# Patient Record
Sex: MALE | Race: OTHER | Employment: FULL TIME | URBAN - METROPOLITAN AREA
[De-identification: names, ages, dates, MRNs, and addresses within clinical notes are randomized per-mention and may not be internally consistent; named-entity substitution may affect disease eponyms.]

---

## 2019-12-24 ENCOUNTER — ANESTHESIA EVENT (OUTPATIENT)
Dept: SURGERY | Facility: HOSPITAL | Age: 59
DRG: 330 | End: 2019-12-24
Payer: COMMERCIAL

## 2019-12-24 ENCOUNTER — ANESTHESIA (OUTPATIENT)
Dept: SURGERY | Facility: HOSPITAL | Age: 59
DRG: 330 | End: 2019-12-24
Payer: COMMERCIAL

## 2019-12-24 ENCOUNTER — HOSPITAL ENCOUNTER (EMERGENCY)
Facility: HOSPITAL | Age: 59
Discharge: ANOTHER HOSPITAL | End: 2019-12-24
Attending: EMERGENCY MEDICINE
Payer: COMMERCIAL

## 2019-12-24 ENCOUNTER — HOSPITAL ENCOUNTER (INPATIENT)
Facility: HOSPITAL | Age: 59
LOS: 2 days | Discharge: HOME | DRG: 330 | End: 2019-12-26
Attending: COLON & RECTAL SURGERY | Admitting: COLON & RECTAL SURGERY
Payer: COMMERCIAL

## 2019-12-24 VITALS
HEIGHT: 68 IN | SYSTOLIC BLOOD PRESSURE: 121 MMHG | HEART RATE: 64 BPM | DIASTOLIC BLOOD PRESSURE: 70 MMHG | RESPIRATION RATE: 18 BRPM | BODY MASS INDEX: 19.1 KG/M2 | TEMPERATURE: 99 F | OXYGEN SATURATION: 99 % | WEIGHT: 126 LBS

## 2019-12-24 DIAGNOSIS — K61.1 PERIRECTAL ABSCESS: Primary | ICD-10-CM

## 2019-12-24 DIAGNOSIS — R19.7 DIARRHEA, UNSPECIFIED TYPE: ICD-10-CM

## 2019-12-24 DIAGNOSIS — R10.9 ABDOMINAL PAIN: ICD-10-CM

## 2019-12-24 DIAGNOSIS — I95.9 HYPOTENSION, UNSPECIFIED HYPOTENSION TYPE: ICD-10-CM

## 2019-12-24 DIAGNOSIS — R00.0 TACHYCARDIA: ICD-10-CM

## 2019-12-24 LAB
ALBUMIN SERPL BCP-MCNC: 2.5 G/DL (ref 3.5–5.2)
ALP SERPL-CCNC: 50 U/L (ref 55–135)
ALT SERPL W/O P-5'-P-CCNC: 35 U/L (ref 10–44)
ANION GAP SERPL CALC-SCNC: 13 MMOL/L (ref 8–16)
APTT BLDCRRT: 42.9 SEC (ref 21–32)
AST SERPL-CCNC: 34 U/L (ref 10–40)
BASOPHILS # BLD AUTO: 0.02 K/UL (ref 0–0.2)
BASOPHILS NFR BLD: 0.2 % (ref 0–1.9)
BILIRUB SERPL-MCNC: 1.1 MG/DL (ref 0.1–1)
BILIRUB UR QL STRIP: NEGATIVE
BUN SERPL-MCNC: 13 MG/DL (ref 6–20)
CALCIUM SERPL-MCNC: 8.3 MG/DL (ref 8.7–10.5)
CHLORIDE SERPL-SCNC: 90 MMOL/L (ref 95–110)
CLARITY UR: CLEAR
CO2 SERPL-SCNC: 25 MMOL/L (ref 23–29)
COLOR UR: YELLOW
CREAT SERPL-MCNC: 0.9 MG/DL (ref 0.5–1.4)
DIFFERENTIAL METHOD: ABNORMAL
EOSINOPHIL # BLD AUTO: 0 K/UL (ref 0–0.5)
EOSINOPHIL NFR BLD: 0 % (ref 0–8)
ERYTHROCYTE [DISTWIDTH] IN BLOOD BY AUTOMATED COUNT: 12 % (ref 11.5–14.5)
EST. GFR  (AFRICAN AMERICAN): >60 ML/MIN/1.73 M^2
EST. GFR  (NON AFRICAN AMERICAN): >60 ML/MIN/1.73 M^2
GLUCOSE SERPL-MCNC: 118 MG/DL (ref 70–110)
GLUCOSE UR QL STRIP: NEGATIVE
HCT VFR BLD AUTO: 37 % (ref 40–54)
HGB BLD-MCNC: 12.9 G/DL (ref 14–18)
HGB UR QL STRIP: NEGATIVE
IMM GRANULOCYTES # BLD AUTO: 0.04 K/UL (ref 0–0.04)
IMM GRANULOCYTES NFR BLD AUTO: 0.4 % (ref 0–0.5)
INR PPP: 1.9 (ref 0.8–1.2)
KETONES UR QL STRIP: ABNORMAL
LACTATE SERPL-SCNC: 1.8 MMOL/L (ref 0.5–2.2)
LEUKOCYTE ESTERASE UR QL STRIP: NEGATIVE
LIPASE SERPL-CCNC: 28 U/L (ref 4–60)
LYMPHOCYTES # BLD AUTO: 0.9 K/UL (ref 1–4.8)
LYMPHOCYTES NFR BLD: 9.7 % (ref 18–48)
MCH RBC QN AUTO: 32.3 PG (ref 27–31)
MCHC RBC AUTO-ENTMCNC: 34.9 G/DL (ref 32–36)
MCV RBC AUTO: 93 FL (ref 82–98)
MONOCYTES # BLD AUTO: 1 K/UL (ref 0.3–1)
MONOCYTES NFR BLD: 10.6 % (ref 4–15)
NEUTROPHILS # BLD AUTO: 7.1 K/UL (ref 1.8–7.7)
NEUTROPHILS NFR BLD: 79.1 % (ref 38–73)
NITRITE UR QL STRIP: NEGATIVE
NRBC BLD-RTO: 0 /100 WBC
PH UR STRIP: 7 [PH] (ref 5–8)
PLATELET # BLD AUTO: 373 K/UL (ref 150–350)
PMV BLD AUTO: 8.7 FL (ref 9.2–12.9)
POTASSIUM SERPL-SCNC: 3.1 MMOL/L (ref 3.5–5.1)
PROCALCITONIN SERPL IA-MCNC: 0.3 NG/ML
PROT SERPL-MCNC: 7.4 G/DL (ref 6–8.4)
PROT UR QL STRIP: NEGATIVE
PROTHROMBIN TIME: 20.4 SEC (ref 9–12.5)
RBC # BLD AUTO: 4 M/UL (ref 4.6–6.2)
SODIUM SERPL-SCNC: 128 MMOL/L (ref 136–145)
SP GR UR STRIP: <=1.005 (ref 1–1.03)
TROPONIN I SERPL DL<=0.01 NG/ML-MCNC: 0.01 NG/ML (ref 0.02–0.5)
URN SPEC COLLECT METH UR: ABNORMAL
UROBILINOGEN UR STRIP-ACNC: 4 EU/DL
WBC # BLD AUTO: 9 K/UL (ref 3.9–12.7)

## 2019-12-24 PROCEDURE — 74176 CT ABD & PELVIS W/O CONTRAST: CPT | Mod: TC

## 2019-12-24 PROCEDURE — 85025 COMPLETE CBC W/AUTO DIFF WBC: CPT

## 2019-12-24 PROCEDURE — 71046 XR CHEST PA AND LATERAL: ICD-10-PCS | Mod: 26,,, | Performed by: RADIOLOGY

## 2019-12-24 PROCEDURE — 36415 COLL VENOUS BLD VENIPUNCTURE: CPT

## 2019-12-24 PROCEDURE — 85610 PROTHROMBIN TIME: CPT

## 2019-12-24 PROCEDURE — 84484 ASSAY OF TROPONIN QUANT: CPT

## 2019-12-24 PROCEDURE — 37000008 HC ANESTHESIA 1ST 15 MINUTES: Performed by: COLON & RECTAL SURGERY

## 2019-12-24 PROCEDURE — 71046 X-RAY EXAM CHEST 2 VIEWS: CPT | Mod: 26,,, | Performed by: RADIOLOGY

## 2019-12-24 PROCEDURE — 36410 VNPNXR 3YR/> PHY/QHP DX/THER: CPT | Mod: 59,,, | Performed by: ANESTHESIOLOGY

## 2019-12-24 PROCEDURE — 96361 HYDRATE IV INFUSION ADD-ON: CPT

## 2019-12-24 PROCEDURE — C1729 CATH, DRAINAGE: HCPCS | Performed by: COLON & RECTAL SURGERY

## 2019-12-24 PROCEDURE — 25500020 PHARM REV CODE 255: Performed by: EMERGENCY MEDICINE

## 2019-12-24 PROCEDURE — 74177 CT ABDOMEN PELVIS WITH CONTRAST: ICD-10-PCS | Mod: 26,,, | Performed by: RADIOLOGY

## 2019-12-24 PROCEDURE — 83605 ASSAY OF LACTIC ACID: CPT

## 2019-12-24 PROCEDURE — 63600175 PHARM REV CODE 636 W HCPCS: Performed by: EMERGENCY MEDICINE

## 2019-12-24 PROCEDURE — 46040 I&D ISCHIORCT&/PERIRCT ABSC: CPT | Mod: ,,, | Performed by: COLON & RECTAL SURGERY

## 2019-12-24 PROCEDURE — D9220A PRA ANESTHESIA: ICD-10-PCS | Mod: CRNA,,, | Performed by: NURSE ANESTHETIST, CERTIFIED REGISTERED

## 2019-12-24 PROCEDURE — 36000705 HC OR TIME LEV I EA ADD 15 MIN: Performed by: COLON & RECTAL SURGERY

## 2019-12-24 PROCEDURE — 84145 PROCALCITONIN (PCT): CPT

## 2019-12-24 PROCEDURE — 63600175 PHARM REV CODE 636 W HCPCS: Performed by: NURSE ANESTHETIST, CERTIFIED REGISTERED

## 2019-12-24 PROCEDURE — 25000003 PHARM REV CODE 250: Performed by: EMERGENCY MEDICINE

## 2019-12-24 PROCEDURE — D9220A PRA ANESTHESIA: Mod: CRNA,,, | Performed by: NURSE ANESTHETIST, CERTIFIED REGISTERED

## 2019-12-24 PROCEDURE — 83690 ASSAY OF LIPASE: CPT

## 2019-12-24 PROCEDURE — 37000009 HC ANESTHESIA EA ADD 15 MINS: Performed by: COLON & RECTAL SURGERY

## 2019-12-24 PROCEDURE — 74176 CT ABD & PELVIS W/O CONTRAST: CPT | Mod: 26,59,, | Performed by: RADIOLOGY

## 2019-12-24 PROCEDURE — 46040 PR I&D PERIRECTAL ABSCESS: ICD-10-PCS | Mod: ,,, | Performed by: COLON & RECTAL SURGERY

## 2019-12-24 PROCEDURE — 63600175 PHARM REV CODE 636 W HCPCS: Performed by: SURGERY

## 2019-12-24 PROCEDURE — 71000033 HC RECOVERY, INTIAL HOUR: Performed by: COLON & RECTAL SURGERY

## 2019-12-24 PROCEDURE — 96365 THER/PROPH/DIAG IV INF INIT: CPT | Mod: 59

## 2019-12-24 PROCEDURE — D9220A PRA ANESTHESIA: Mod: ANES,,, | Performed by: ANESTHESIOLOGY

## 2019-12-24 PROCEDURE — 81003 URINALYSIS AUTO W/O SCOPE: CPT

## 2019-12-24 PROCEDURE — 36410 PERIPHERAL IV INSERTION: ICD-10-PCS | Mod: 59,,, | Performed by: ANESTHESIOLOGY

## 2019-12-24 PROCEDURE — 71046 X-RAY EXAM CHEST 2 VIEWS: CPT | Mod: TC,FY

## 2019-12-24 PROCEDURE — 80053 COMPREHEN METABOLIC PANEL: CPT

## 2019-12-24 PROCEDURE — 99285 EMERGENCY DEPT VISIT HI MDM: CPT | Mod: 25

## 2019-12-24 PROCEDURE — 93005 ELECTROCARDIOGRAM TRACING: CPT

## 2019-12-24 PROCEDURE — 74177 CT ABD & PELVIS W/CONTRAST: CPT | Mod: 26,,, | Performed by: RADIOLOGY

## 2019-12-24 PROCEDURE — 36000704 HC OR TIME LEV I 1ST 15 MIN: Performed by: COLON & RECTAL SURGERY

## 2019-12-24 PROCEDURE — 25000003 PHARM REV CODE 250: Performed by: NURSE ANESTHETIST, CERTIFIED REGISTERED

## 2019-12-24 PROCEDURE — 87040 BLOOD CULTURE FOR BACTERIA: CPT

## 2019-12-24 PROCEDURE — D9220A PRA ANESTHESIA: ICD-10-PCS | Mod: ANES,,, | Performed by: ANESTHESIOLOGY

## 2019-12-24 PROCEDURE — 11000001 HC ACUTE MED/SURG PRIVATE ROOM

## 2019-12-24 PROCEDURE — 74176 CT ABDOMEN PELVIS WITHOUT CONTRAST: ICD-10-PCS | Mod: 26,59,, | Performed by: RADIOLOGY

## 2019-12-24 PROCEDURE — 85730 THROMBOPLASTIN TIME PARTIAL: CPT

## 2019-12-24 PROCEDURE — 74177 CT ABD & PELVIS W/CONTRAST: CPT | Mod: TC

## 2019-12-24 PROCEDURE — C9290 INJ, BUPIVACAINE LIPOSOME: HCPCS | Performed by: SURGERY

## 2019-12-24 RX ORDER — KETAMINE HCL IN 0.9 % NACL 50 MG/5 ML
SYRINGE (ML) INTRAVENOUS
Status: DISCONTINUED | OUTPATIENT
Start: 2019-12-24 | End: 2019-12-24

## 2019-12-24 RX ORDER — LIDOCAINE HCL/PF 100 MG/5ML
SYRINGE (ML) INTRAVENOUS
Status: DISCONTINUED | OUTPATIENT
Start: 2019-12-24 | End: 2019-12-24

## 2019-12-24 RX ORDER — NALOXONE HCL 0.4 MG/ML
0.02 VIAL (ML) INJECTION
Status: DISCONTINUED | OUTPATIENT
Start: 2019-12-25 | End: 2019-12-26 | Stop reason: HOSPADM

## 2019-12-24 RX ORDER — CIPROFLOXACIN 500 MG/1
500 TABLET ORAL
Status: ON HOLD | COMMUNITY
End: 2019-12-26 | Stop reason: HOSPADM

## 2019-12-24 RX ORDER — MIDAZOLAM HYDROCHLORIDE 1 MG/ML
INJECTION, SOLUTION INTRAMUSCULAR; INTRAVENOUS
Status: DISCONTINUED | OUTPATIENT
Start: 2019-12-24 | End: 2019-12-24

## 2019-12-24 RX ORDER — SODIUM CHLORIDE 9 MG/ML
INJECTION, SOLUTION INTRAVENOUS CONTINUOUS
Status: DISCONTINUED | OUTPATIENT
Start: 2019-12-24 | End: 2019-12-24

## 2019-12-24 RX ORDER — ACETAMINOPHEN 325 MG/1
650 TABLET ORAL EVERY 6 HOURS
Status: DISCONTINUED | OUTPATIENT
Start: 2019-12-25 | End: 2019-12-26 | Stop reason: HOSPADM

## 2019-12-24 RX ORDER — ONDANSETRON 2 MG/ML
4 INJECTION INTRAMUSCULAR; INTRAVENOUS DAILY PRN
Status: DISCONTINUED | OUTPATIENT
Start: 2019-12-24 | End: 2019-12-26 | Stop reason: HOSPADM

## 2019-12-24 RX ORDER — ONDANSETRON 2 MG/ML
INJECTION INTRAMUSCULAR; INTRAVENOUS
Status: DISCONTINUED | OUTPATIENT
Start: 2019-12-24 | End: 2019-12-24

## 2019-12-24 RX ORDER — ONDANSETRON 2 MG/ML
4 INJECTION INTRAMUSCULAR; INTRAVENOUS EVERY 12 HOURS PRN
Status: DISCONTINUED | OUTPATIENT
Start: 2019-12-25 | End: 2019-12-26 | Stop reason: HOSPADM

## 2019-12-24 RX ORDER — ROCURONIUM BROMIDE 10 MG/ML
INJECTION, SOLUTION INTRAVENOUS
Status: DISCONTINUED | OUTPATIENT
Start: 2019-12-24 | End: 2019-12-24

## 2019-12-24 RX ORDER — AMLODIPINE BESYLATE 10 MG/1
10 TABLET ORAL DAILY
Status: DISCONTINUED | OUTPATIENT
Start: 2019-12-25 | End: 2019-12-26 | Stop reason: HOSPADM

## 2019-12-24 RX ORDER — AMLODIPINE BESYLATE 10 MG/1
10 TABLET ORAL DAILY
COMMUNITY

## 2019-12-24 RX ORDER — AMOXICILLIN AND CLAVULANATE POTASSIUM 875; 125 MG/1; MG/1
1 TABLET, FILM COATED ORAL EVERY 12 HOURS
Status: DISCONTINUED | OUTPATIENT
Start: 2019-12-25 | End: 2019-12-26

## 2019-12-24 RX ORDER — OXYCODONE HYDROCHLORIDE 10 MG/1
10 TABLET ORAL EVERY 6 HOURS PRN
Status: DISCONTINUED | OUTPATIENT
Start: 2019-12-25 | End: 2019-12-26 | Stop reason: HOSPADM

## 2019-12-24 RX ORDER — PERMETHRIN 50 MG/G
CREAM TOPICAL DAILY
Status: DISCONTINUED | OUTPATIENT
Start: 2019-12-25 | End: 2019-12-25

## 2019-12-24 RX ORDER — SODIUM CHLORIDE 9 MG/ML
1000 INJECTION, SOLUTION INTRAVENOUS
Status: COMPLETED | OUTPATIENT
Start: 2019-12-24 | End: 2019-12-24

## 2019-12-24 RX ORDER — DEXTROSE MONOHYDRATE, SODIUM CHLORIDE, AND POTASSIUM CHLORIDE 50; 1.49; 4.5 G/1000ML; G/1000ML; G/1000ML
INJECTION, SOLUTION INTRAVENOUS CONTINUOUS
Status: DISCONTINUED | OUTPATIENT
Start: 2019-12-25 | End: 2019-12-25

## 2019-12-24 RX ORDER — OXYCODONE HYDROCHLORIDE 5 MG/1
5 TABLET ORAL EVERY 4 HOURS PRN
Status: DISCONTINUED | OUTPATIENT
Start: 2019-12-25 | End: 2019-12-26 | Stop reason: HOSPADM

## 2019-12-24 RX ORDER — LISINOPRIL 20 MG/1
20 TABLET ORAL DAILY
Status: DISCONTINUED | OUTPATIENT
Start: 2019-12-25 | End: 2019-12-26 | Stop reason: HOSPADM

## 2019-12-24 RX ORDER — ACETAMINOPHEN 325 MG/1
650 TABLET ORAL
Status: COMPLETED | OUTPATIENT
Start: 2019-12-24 | End: 2019-12-24

## 2019-12-24 RX ORDER — FENTANYL CITRATE 50 UG/ML
INJECTION, SOLUTION INTRAMUSCULAR; INTRAVENOUS
Status: DISCONTINUED | OUTPATIENT
Start: 2019-12-24 | End: 2019-12-24

## 2019-12-24 RX ORDER — MUPIROCIN 20 MG/G
OINTMENT TOPICAL
Status: DISCONTINUED | OUTPATIENT
Start: 2019-12-24 | End: 2019-12-24

## 2019-12-24 RX ORDER — LISINOPRIL 20 MG/1
20 TABLET ORAL DAILY
COMMUNITY

## 2019-12-24 RX ORDER — ONDANSETRON 2 MG/ML
4 INJECTION INTRAMUSCULAR; INTRAVENOUS ONCE AS NEEDED
Status: DISCONTINUED | OUTPATIENT
Start: 2019-12-25 | End: 2019-12-26 | Stop reason: HOSPADM

## 2019-12-24 RX ORDER — PHENYLEPHRINE HYDROCHLORIDE 10 MG/ML
INJECTION INTRAVENOUS
Status: DISCONTINUED | OUTPATIENT
Start: 2019-12-24 | End: 2019-12-24

## 2019-12-24 RX ORDER — SODIUM CHLORIDE 0.9 % (FLUSH) 0.9 %
10 SYRINGE (ML) INJECTION
Status: DISCONTINUED | OUTPATIENT
Start: 2019-12-25 | End: 2019-12-26 | Stop reason: HOSPADM

## 2019-12-24 RX ORDER — HEPARIN SODIUM 5000 [USP'U]/ML
5000 INJECTION, SOLUTION INTRAVENOUS; SUBCUTANEOUS EVERY 8 HOURS
Status: DISCONTINUED | OUTPATIENT
Start: 2019-12-25 | End: 2019-12-26 | Stop reason: HOSPADM

## 2019-12-24 RX ORDER — HYDROMORPHONE HYDROCHLORIDE 1 MG/ML
0.2 INJECTION, SOLUTION INTRAMUSCULAR; INTRAVENOUS; SUBCUTANEOUS EVERY 5 MIN PRN
Status: DISCONTINUED | OUTPATIENT
Start: 2019-12-24 | End: 2019-12-26 | Stop reason: HOSPADM

## 2019-12-24 RX ORDER — DEXAMETHASONE SODIUM PHOSPHATE 4 MG/ML
INJECTION, SOLUTION INTRA-ARTICULAR; INTRALESIONAL; INTRAMUSCULAR; INTRAVENOUS; SOFT TISSUE
Status: DISCONTINUED | OUTPATIENT
Start: 2019-12-24 | End: 2019-12-24

## 2019-12-24 RX ORDER — POTASSIUM CHLORIDE 20 MEQ/1
40 TABLET, EXTENDED RELEASE ORAL
Status: COMPLETED | OUTPATIENT
Start: 2019-12-24 | End: 2019-12-24

## 2019-12-24 RX ORDER — SUCCINYLCHOLINE CHLORIDE 20 MG/ML
INJECTION INTRAMUSCULAR; INTRAVENOUS
Status: DISCONTINUED | OUTPATIENT
Start: 2019-12-24 | End: 2019-12-24

## 2019-12-24 RX ORDER — DIPHENOXYLATE HYDROCHLORIDE AND ATROPINE SULFATE 2.5; .025 MG/1; MG/1
1 TABLET ORAL 4 TIMES DAILY PRN
COMMUNITY

## 2019-12-24 RX ORDER — PROPOFOL 10 MG/ML
VIAL (ML) INTRAVENOUS
Status: DISCONTINUED | OUTPATIENT
Start: 2019-12-24 | End: 2019-12-24

## 2019-12-24 RX ADMIN — MIDAZOLAM HYDROCHLORIDE 2 MG: 1 INJECTION, SOLUTION INTRAMUSCULAR; INTRAVENOUS at 09:12

## 2019-12-24 RX ADMIN — SODIUM CHLORIDE, SODIUM GLUCONATE, SODIUM ACETATE, POTASSIUM CHLORIDE, MAGNESIUM CHLORIDE, SODIUM PHOSPHATE, DIBASIC, AND POTASSIUM PHOSPHATE: .53; .5; .37; .037; .03; .012; .00082 INJECTION, SOLUTION INTRAVENOUS at 10:12

## 2019-12-24 RX ADMIN — SUCCINYLCHOLINE CHLORIDE 120 MG: 20 INJECTION, SOLUTION INTRAMUSCULAR; INTRAVENOUS at 09:12

## 2019-12-24 RX ADMIN — ONDANSETRON 4 MG: 2 INJECTION INTRAMUSCULAR; INTRAVENOUS at 10:12

## 2019-12-24 RX ADMIN — ACETAMINOPHEN 650 MG: 325 TABLET ORAL at 03:12

## 2019-12-24 RX ADMIN — PHENYLEPHRINE HYDROCHLORIDE 100 MCG: 10 INJECTION INTRAVENOUS at 10:12

## 2019-12-24 RX ADMIN — SODIUM CHLORIDE, SODIUM GLUCONATE, SODIUM ACETATE, POTASSIUM CHLORIDE, MAGNESIUM CHLORIDE, SODIUM PHOSPHATE, DIBASIC, AND POTASSIUM PHOSPHATE: .53; .5; .37; .037; .03; .012; .00082 INJECTION, SOLUTION INTRAVENOUS at 09:12

## 2019-12-24 RX ADMIN — ERTAPENEM SODIUM 1 G: 1 INJECTION, POWDER, LYOPHILIZED, FOR SOLUTION INTRAMUSCULAR; INTRAVENOUS at 02:12

## 2019-12-24 RX ADMIN — PROPOFOL 150 MG: 10 INJECTION, EMULSION INTRAVENOUS at 09:12

## 2019-12-24 RX ADMIN — LIDOCAINE HYDROCHLORIDE 75 MG: 20 INJECTION, SOLUTION INTRAVENOUS at 09:12

## 2019-12-24 RX ADMIN — FENTANYL CITRATE 100 MCG: 50 INJECTION, SOLUTION INTRAMUSCULAR; INTRAVENOUS at 09:12

## 2019-12-24 RX ADMIN — ROCURONIUM BROMIDE 5 MG: 10 INJECTION, SOLUTION INTRAVENOUS at 09:12

## 2019-12-24 RX ADMIN — Medication 20 MG: at 10:12

## 2019-12-24 RX ADMIN — SODIUM CHLORIDE 1686 ML: 0.9 INJECTION, SOLUTION INTRAVENOUS at 01:12

## 2019-12-24 RX ADMIN — IOHEXOL 75 ML: 350 INJECTION, SOLUTION INTRAVENOUS at 01:12

## 2019-12-24 RX ADMIN — POTASSIUM CHLORIDE, DEXTROSE MONOHYDRATE AND SODIUM CHLORIDE: 150; 5; 450 INJECTION, SOLUTION INTRAVENOUS at 11:12

## 2019-12-24 RX ADMIN — SODIUM CHLORIDE 1000 ML: 0.9 INJECTION, SOLUTION INTRAVENOUS at 03:12

## 2019-12-24 RX ADMIN — DEXAMETHASONE SODIUM PHOSPHATE 8 MG: 4 INJECTION, SOLUTION INTRAMUSCULAR; INTRAVENOUS at 10:12

## 2019-12-24 RX ADMIN — POTASSIUM CHLORIDE 40 MEQ: 1500 TABLET, EXTENDED RELEASE ORAL at 03:12

## 2019-12-24 NOTE — HPI
59 year old male presents with diarrhea, abdominal pain, and perirectal pain. PMH HTN, initially presented to ED with his wife for emergent evaluation of persistent diarrhea over the last 14 days. In hindsight, he now wonders if he was not having diarrhea and that he's just been draining pus for about the past week from his anal area. Endorses worsening perianal pain over the past week. No prior episodes similar to this. Pain is on the right side, worsens with moving and sitting. Has not taken any PO today.  Admits to subjective fever and chills. Denies nausea, vomiting. 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

## 2019-12-24 NOTE — ED NOTES
"Abdias at [facility] called with room assignment.   Patient accepted at [facility] by Dr. Sanchez, room 529.   Number for report [phone].     Abdias states "I am calling to set up transport now."  Patient needs to be Priority 2 within the hour per Dr. Olivia. Abdias informed.   "

## 2019-12-24 NOTE — ED NOTES
"Pt arrives to room via wheelchair, states, "I went to the doctor a couple weeks ago and was prescribed antibiotics then went home, felt well over the weekend, then Monday night started feeling terrible again, lightheadedness, c/o diarrhea, went home and went to bed." No signs of distress noted.   "

## 2019-12-24 NOTE — ED PROVIDER NOTES
"Encounter Date: 12/24/2019       History     Chief Complaint   Patient presents with    Abdominal Pain    Diarrhea     Very frail 60yo male with pmh HTN presents to ED with his wife for emergent evaluation of persistent diarrhea over the last 14 days. States "I went to the doctor a couple weeks ago, was prescribed antibiotics then went home. I felt well over the weekend, then Monday night started feeling terrible again, lightheaded with continued diarrhea. I just went home and went to bed." Abdominal pain is mild, non-radiating, intermittent and cramping in nature. Wife states his normal weight is 148 lb, but he now weighs 127 lb as he has "not been able to keep anything in his system." Admits to subjective fever and chills. Denies nausea, vomiting.         Review of patient's allergies indicates:  No Known Allergies  Past Medical History:   Diagnosis Date    Hypertension      Past Surgical History:   Procedure Laterality Date    KNEE SURGERY       History reviewed. No pertinent family history.  Social History     Tobacco Use    Smoking status: Current Every Day Smoker   Substance Use Topics    Alcohol use: Not Currently     Frequency: Never    Drug use: Not Currently     Review of Systems   Constitutional: Positive for chills and fever. Negative for appetite change, diaphoresis and fatigue.   HENT: Negative for congestion, ear pain, rhinorrhea, sinus pressure, sinus pain, sore throat and tinnitus.    Eyes: Negative for photophobia and visual disturbance.   Respiratory: Negative for cough, chest tightness, shortness of breath and wheezing.    Cardiovascular: Negative for chest pain, palpitations and leg swelling.   Gastrointestinal: Positive for abdominal pain and diarrhea. Negative for constipation, nausea and vomiting.   Endocrine: Negative for cold intolerance, heat intolerance, polydipsia, polyphagia and polyuria.   Genitourinary: Negative for decreased urine volume, difficulty urinating, dysuria, flank " pain, frequency, hematuria and urgency.   Musculoskeletal: Negative for arthralgias, back pain, gait problem, joint swelling, myalgias, neck pain and neck stiffness.   Skin: Negative for color change, pallor, rash and wound.   Allergic/Immunologic: Negative for immunocompromised state.   Neurological: Negative for dizziness, syncope, weakness, light-headedness, numbness and headaches.   Hematological: Negative for adenopathy. Does not bruise/bleed easily.   Psychiatric/Behavioral: Negative for decreased concentration, dysphoric mood and sleep disturbance. The patient is not nervous/anxious.    All other systems reviewed and are negative.      Physical Exam     Initial Vitals [12/24/19 1215]   BP Pulse Resp Temp SpO2   (!) 87/55 89 16 98.5 °F (36.9 °C) 100 %      MAP       --         Physical Exam    Nursing note and vitals reviewed.  Constitutional: He appears well-developed. He is not diaphoretic. He appears cachectic. He appears ill. No distress.   HENT:   Head: Normocephalic and atraumatic.   Right Ear: External ear normal.   Left Ear: External ear normal.   Nose: Nose normal.   Mouth/Throat: Oropharynx is clear and moist.   Eyes: Conjunctivae are normal. Pupils are equal, round, and reactive to light. No scleral icterus.   Neck: Normal range of motion. Neck supple. No JVD present.   Cardiovascular: Regular rhythm, normal heart sounds and intact distal pulses. Tachycardia present.    hypotensive   Pulmonary/Chest: Breath sounds normal. No respiratory distress. He has no wheezes. He has no rhonchi. He has no rales. He exhibits no tenderness.   Abdominal: Soft. Bowel sounds are normal. He exhibits no distension. There is generalized tenderness. There is no rebound and no guarding.   Musculoskeletal: Normal range of motion. He exhibits no edema or tenderness.   Lymphadenopathy:     He has no cervical adenopathy.   Neurological: He is alert and oriented to person, place, and time. GCS score is 15. GCS eye subscore is  4. GCS verbal subscore is 5. GCS motor subscore is 6.   Skin: Skin is warm and dry. Capillary refill takes less than 2 seconds. No rash and no abscess noted. No erythema. No pallor.   Psychiatric: He has a normal mood and affect. His behavior is normal. Judgment and thought content normal.         ED Course   Procedures  Labs Reviewed   CBC W/ AUTO DIFFERENTIAL - Abnormal; Notable for the following components:       Result Value    RBC 4.00 (*)     Hemoglobin 12.9 (*)     Hematocrit 37.0 (*)     Mean Corpuscular Hemoglobin 32.3 (*)     Platelets 373 (*)     MPV 8.7 (*)     Lymph # 0.9 (*)     Gran% 79.1 (*)     Lymph% 9.7 (*)     All other components within normal limits   COMPREHENSIVE METABOLIC PANEL - Abnormal; Notable for the following components:    Sodium 128 (*)     Potassium 3.1 (*)     Chloride 90 (*)     Glucose 118 (*)     Calcium 8.3 (*)     Albumin 2.5 (*)     Total Bilirubin 1.1 (*)     Alkaline Phosphatase 50 (*)     All other components within normal limits   URINALYSIS, REFLEX TO URINE CULTURE - Abnormal; Notable for the following components:    Ketones, UA Trace (*)     All other components within normal limits    Narrative:     Preferred Collection Type->Urine, Clean Catch   PROTIME-INR - Abnormal; Notable for the following components:    Prothrombin Time 20.4 (*)     INR 1.9 (*)     All other components within normal limits   APTT - Abnormal; Notable for the following components:    aPTT 42.9 (*)     All other components within normal limits   TROPONIN I - Abnormal; Notable for the following components:    Troponin I 0.01 (*)     All other components within normal limits   CULTURE, BLOOD   CULTURE, BLOOD   LACTIC ACID, PLASMA   LIPASE   PROCALCITONIN   PROCALCITONIN     EKG Readings: (Independently Interpreted)   Initial Reading: No STEMI. Rhythm: Normal Sinus Rhythm. Heart Rate: 86. Ectopy: No Ectopy. Conduction: Normal. ST Segments: Normal ST Segments. T Waves: Normal. Axis: Normal. Clinical  Impression: Normal Sinus Rhythm       Imaging Results          CT Abdomen Pelvis With Contrast (Final result)  Result time 12/24/19 14:17:58    Final result by Kirill Stokes MD (12/24/19 14:17:58)                 Impression:      Adjacent perirectal abscesses within the perirectal fat as well as a subcutaneous abscess of the medial right buttocks at the level of the anal verge.      Electronically signed by: Kirill Stokes  Date:    12/24/2019  Time:    14:17             Narrative:    EXAMINATION:  CT ABDOMEN PELVIS WITH CONTRAST    CLINICAL HISTORY:  Abd pain, fever, abscess suspected;    TECHNIQUE:  Low dose axial images, sagittal and coronal reformations were obtained from the lung bases to the pubic symphysis following the IV administration of 75 mL of Omnipaque 350 .    COMPARISON:  CT earlier same day.    FINDINGS:  Motion artifact limits evaluation.  The lungs are clear.  The liver, spleen pancreas this and adrenal are unremarkable.  Dependent sludge within the gallbladder.  Small nonobstructing left renal calculus.    Air and stool throughout the loops of colon.  No CT evidence for bowel obstruction.  The bladder is distended.  Prostate is mildly enlarged.    There are rim enhancing fluid collections within the perirectal fat as well as within the subcutaneous tissue of the medial right buttocks at the level of the anal verge consistent with multiple perirectal abscesses.  The right perirectal abscess measures 6.2 cm AP x 2.3 cm transverse.  The smaller left perirectal abscess measures 2.9 cm AP x 1.4 cm transverse.  The subcutaneous abscess of the medial right buttocks is partially visualized measuring 7.0 cm AP x 4.0 cm transverse.                               X-Ray Chest PA And Lateral (Final result)  Result time 12/24/19 12:53:13    Final result by Kirill Stokes MD (12/24/19 12:53:13)                 Impression:      1. COPD.  2. Small right upper lobe pulmonary nodule.  This may represent a  granuloma.  If an outside prior chest x-ray is available for comparison, stability may be established.  3. Mild dextroscoliosis.      Electronically signed by: Kirill Stokes  Date:    12/24/2019  Time:    12:53             Narrative:    EXAMINATION:  XR CHEST PA AND LATERAL    CLINICAL HISTORY:  Unspecified abdominal pain    TECHNIQUE:  PA and lateral views of the chest were performed.    COMPARISON:  None    FINDINGS:  Pulmonary hyperinflation with flattening diaphragms consistent with COPD.  Mild chronic interstitial change.  No focal consolidation.  Small 4 mm nodule of the right upper lobe.    Heart size is normal.  Mediastinal contours unremarkable.  Trachea midline.    Mild dextroscoliosis.                                CT Abdomen Pelvis  Without Contrast (Final result)  Result time 12/24/19 12:51:58    Final result by Kirill Stokes MD (12/24/19 12:51:58)                 Impression:      1. Dependent gallbladder sludge.  Further evaluation with gallbladder ultrasound as deemed clinically necessary.  2. Small nonobstructing left renal calculus.  3. Suspected perirectal abscesses adjacent to the distal rectum and anal verge.  However, evaluation is limited due to lack of intravenous contrast.  Correlate clinically with possible fever and/or elevated white count.  This report was flagged in Epic as abnormal.      Electronically signed by: Kirill Stokes  Date:    12/24/2019  Time:    12:51             Narrative:    EXAMINATION:  CT ABDOMEN PELVIS WITHOUT CONTRAST    CLINICAL HISTORY:  Abd pain, fever, abscess suspected;    TECHNIQUE:  Low dose axial images, sagittal and coronal reformations were obtained from the lung bases to the pubic symphysis.    COMPARISON:  None    FINDINGS:  The lung bases are clear.  No pleural or pericardial effusions.    The liver and spleen are normal in size and attenuation.  Dependent sludge is present within the gallbladder.  No pericholecystic fluid.  No intra or  extrahepatic biliary ductal dilatation.  The pancreas and adrenal glands are unremarkable.    Kidneys are normal in size and attenuation.  Small nonobstructing 3 mm left renal calculus.  No right renal calculi.  Renal vascular calcifications are present.  No changes of hydronephrosis.  No perinephric inflammatory change.    Air and stool throughout the colon and rectum.  No mesenteric inflammatory change.  No CT evidence for bowel obstruction.  The appendix is normal in caliber.    The bladder is prominently distended.  Prostate is mildly enlarged.  Seminal vesicles are unremarkable.    Adjacent to the distal rectum within the perirectal fat there are small fluid collections with air-fluid level suspicious for adjacent perirectal abscesses.  However evaluation is limited due to lack of intravenous contrast.  On the right this measures 5.6 cm AP x 2.4 cm transverse.  On the left this measures 2.9 cm AP x 1.4 cm transverse.  Mild inflammatory changes present within the perirectal fat as well as within the subcutaneous fat along the anal verge.                                 Medical Decision Making:   Differential Diagnosis:   Sepsis, acute diverticulitis, acute small bowel obstruction, acute pancreatitis, acute cystitis, acute pyelonephritis, acute myocardial infarction  ED Management:  CT concerning for multiple perirectal abscess with potential to progress to [place] with impending sepsis  Arrived tachycardic, hypotensive and febrile. Sepsis order set initiated with aggressive IVF and broad spectrum coverage antibiotics administered. Labs not representative of patient presentation.   [facility] contacted for consult to Colorectal Sx, accepted by Dr. Sanchez to [facility].  He will be kept NPO until evaluated                    ED Course as of Dec 24 1640   Tue Dec 24, 2019   1514 Accepted by Dr. Sanchez - Colorectal Sx - for evaluation of perirectal abscesses and concern for pending sepsis    [MM]      ED Course User Index  [MM]  Tatiana Olivia MD       Patient Condition: Patient stabilized  Reason for Transfer: MD request  Accepting Physician: Laura Ferreira MD: Ne        Clinical Impression:       ICD-10-CM ICD-9-CM   1. Perirectal abscess K61.1 566   2. Abdominal pain R10.9 789.00   3. Tachycardia R00.0 785.0   4. Hypotension, unspecified hypotension type I95.9 458.9   5. Diarrhea, unspecified type R19.7 787.91         Disposition:   Disposition: Transferred  Condition: Serious                     Tatiana Olivia MD  12/24/19 2766

## 2019-12-25 LAB
ANION GAP SERPL CALC-SCNC: 8 MMOL/L (ref 8–16)
BASOPHILS # BLD AUTO: 0.02 K/UL (ref 0–0.2)
BASOPHILS NFR BLD: 0.2 % (ref 0–1.9)
BUN SERPL-MCNC: 11 MG/DL (ref 6–20)
CALCIUM SERPL-MCNC: 7.9 MG/DL (ref 8.7–10.5)
CHLORIDE SERPL-SCNC: 101 MMOL/L (ref 95–110)
CO2 SERPL-SCNC: 24 MMOL/L (ref 23–29)
CREAT SERPL-MCNC: 0.7 MG/DL (ref 0.5–1.4)
DIFFERENTIAL METHOD: ABNORMAL
EOSINOPHIL # BLD AUTO: 0 K/UL (ref 0–0.5)
EOSINOPHIL NFR BLD: 0 % (ref 0–8)
ERYTHROCYTE [DISTWIDTH] IN BLOOD BY AUTOMATED COUNT: 12.3 % (ref 11.5–14.5)
EST. GFR  (AFRICAN AMERICAN): >60 ML/MIN/1.73 M^2
EST. GFR  (NON AFRICAN AMERICAN): >60 ML/MIN/1.73 M^2
GLUCOSE SERPL-MCNC: 141 MG/DL (ref 70–110)
HCT VFR BLD AUTO: 35.7 % (ref 40–54)
HGB BLD-MCNC: 11.9 G/DL (ref 14–18)
IMM GRANULOCYTES # BLD AUTO: 0.04 K/UL (ref 0–0.04)
IMM GRANULOCYTES NFR BLD AUTO: 0.4 % (ref 0–0.5)
LYMPHOCYTES # BLD AUTO: 0.5 K/UL (ref 1–4.8)
LYMPHOCYTES NFR BLD: 4.3 % (ref 18–48)
MCH RBC QN AUTO: 31.6 PG (ref 27–31)
MCHC RBC AUTO-ENTMCNC: 33.3 G/DL (ref 32–36)
MCV RBC AUTO: 95 FL (ref 82–98)
MONOCYTES # BLD AUTO: 0.3 K/UL (ref 0.3–1)
MONOCYTES NFR BLD: 2.5 % (ref 4–15)
NEUTROPHILS # BLD AUTO: 9.7 K/UL (ref 1.8–7.7)
NEUTROPHILS NFR BLD: 92.6 % (ref 38–73)
NRBC BLD-RTO: 0 /100 WBC
PLATELET # BLD AUTO: 374 K/UL (ref 150–350)
PMV BLD AUTO: 9 FL (ref 9.2–12.9)
POTASSIUM SERPL-SCNC: 4.4 MMOL/L (ref 3.5–5.1)
RBC # BLD AUTO: 3.76 M/UL (ref 4.6–6.2)
SODIUM SERPL-SCNC: 133 MMOL/L (ref 136–145)
WBC # BLD AUTO: 10.49 K/UL (ref 3.9–12.7)

## 2019-12-25 PROCEDURE — 80048 BASIC METABOLIC PNL TOTAL CA: CPT

## 2019-12-25 PROCEDURE — 11000001 HC ACUTE MED/SURG PRIVATE ROOM

## 2019-12-25 PROCEDURE — 85025 COMPLETE CBC W/AUTO DIFF WBC: CPT

## 2019-12-25 PROCEDURE — 63600175 PHARM REV CODE 636 W HCPCS: Performed by: SURGERY

## 2019-12-25 PROCEDURE — 25000003 PHARM REV CODE 250: Performed by: SURGERY

## 2019-12-25 PROCEDURE — 36415 COLL VENOUS BLD VENIPUNCTURE: CPT

## 2019-12-25 RX ADMIN — HEPARIN SODIUM 5000 UNITS: 5000 INJECTION, SOLUTION INTRAVENOUS; SUBCUTANEOUS at 05:12

## 2019-12-25 RX ADMIN — ACETAMINOPHEN 650 MG: 325 TABLET ORAL at 02:12

## 2019-12-25 RX ADMIN — HEPARIN SODIUM 5000 UNITS: 5000 INJECTION, SOLUTION INTRAVENOUS; SUBCUTANEOUS at 09:12

## 2019-12-25 RX ADMIN — AMOXICILLIN AND CLAVULANATE POTASSIUM 1 TABLET: 875; 125 TABLET, FILM COATED ORAL at 09:12

## 2019-12-25 RX ADMIN — AMOXICILLIN AND CLAVULANATE POTASSIUM 1 TABLET: 875; 125 TABLET, FILM COATED ORAL at 08:12

## 2019-12-25 RX ADMIN — OXYCODONE HYDROCHLORIDE 10 MG: 10 TABLET ORAL at 01:12

## 2019-12-25 RX ADMIN — ACETAMINOPHEN 650 MG: 325 TABLET ORAL at 05:12

## 2019-12-25 RX ADMIN — HEPARIN SODIUM 5000 UNITS: 5000 INJECTION, SOLUTION INTRAVENOUS; SUBCUTANEOUS at 02:12

## 2019-12-25 RX ADMIN — ACETAMINOPHEN 650 MG: 325 TABLET ORAL at 11:12

## 2019-12-25 NOTE — SUBJECTIVE & OBJECTIVE
Subjective:     Interval History: Drained last night.  Pain improved this morning.  Looks like dry scalp, no lice     Post-Op Info:  Procedure(s) (LRB):  INCISION, ABSCESS, PERIRECTAL (N/A)   1 Day Post-Op      Medications:  Continuous Infusions:  Scheduled Meds:   acetaminophen  650 mg Oral Q6H    amLODIPine  10 mg Oral Daily    amoxicillin-clavulanate 875-125mg  1 tablet Oral Q12H    heparin (porcine)  5,000 Units Subcutaneous Q8H    lisinopril  20 mg Oral Daily     PRN Meds:   HYDROmorphone    naloxone    ondansetron    ondansetron    ondansetron    oxyCODONE    oxyCODONE    sodium chloride 0.9%        Objective:     Vital Signs (Most Recent):  Temp: 97.7 °F (36.5 °C) (12/25/19 0551)  Pulse: (!) 55 (12/25/19 0551)  Resp: 18 (12/25/19 0551)  BP: 127/65 (12/25/19 0551)  SpO2: 98 % (12/25/19 0551) Vital Signs (24h Range):  Temp:  [97.7 °F (36.5 °C)-100.2 °F (37.9 °C)] 97.7 °F (36.5 °C)  Pulse:  [55-89] 55  Resp:  [10-23] 18  SpO2:  [97 %-100 %] 98 %  BP: ()/() 127/65     Intake/Output - Last 3 Shifts       12/23 0700 - 12/24 0659 12/24 0700 - 12/25 0659 12/25 0700 - 12/26 0659    I.V. (mL/kg)  1500 (28.1)     Total Intake(mL/kg)  1500 (28.1)     Net  +1500                  Physical Exam  Much less induration.  S/s drainage around drains. Less tender around anus    Significant Labs:  CBC (Last 3 Results):   Recent Labs   Lab 12/24/19  1300 12/25/19  0514   WBC 9.00 10.49   RBC 4.00* 3.76*   HGB 12.9* 11.9*   HCT 37.0* 35.7*   * 374*   MCV 93 95   MCH 32.3* 31.6*   MCHC 34.9 33.3     CMP (Last 3 Results):   Recent Labs   Lab 12/24/19  1300 12/25/19  0514   * 141*   CALCIUM 8.3* 7.9*   ALBUMIN 2.5*  --    PROT 7.4  --    * 133*   K 3.1* 4.4   CO2 25 24   CL 90* 101   BUN 13 11   CREATININE 0.9 0.7   ALKPHOS 50*  --    ALT 35  --    AST 34  --    BILITOT 1.1*  --      CRP (Last 3 Results): No results for input(s): CRP in the last 168 hours.

## 2019-12-25 NOTE — ANESTHESIA PREPROCEDURE EVALUATION
12/24/2019  Graham Penaloza is a 59 y.o., male presents with diarrhea, abdominal pain, and perirectal pain. PMH HTN, initially presented to ED with his wife for emergent evaluation of persistent diarrhea over the last 14 days. In hindsight, he now wonders if he was not having diarrhea and that he's just been draining pus for about the past week from his anal area. Endorses worsening perianal pain over the past week. No prior episodes similar to this. Pain is on the right side, worsens with moving and sitting. Has not taken any PO today.  Admits to subjective fever and chills. Denies nausea, vomiting.  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     Wife states his weight went from 148-127lbs.   Pre-operative evaluation for Procedure(s) (LRB):  INCISION, ABSCESS, PERIRECTAL (N/A)    Graham Penaloza is a 59 y.o. male     LDA:     Prev airway:     Drips:     Patient Active Problem List   Diagnosis    Perirectal abscess       Review of patient's allergies indicates:  No Known Allergies     No current facility-administered medications on file prior to encounter.      Current Outpatient Medications on File Prior to Encounter   Medication Sig Dispense Refill    amLODIPine (NORVASC) 10 MG tablet Take 10 mg by mouth once daily.      ciprofloxacin HCl (CIPRO) 500 MG tablet Take 500 mg by mouth every 12 (twelve) hours.      diphenoxylate-atropine 2.5-0.025 mg (LOMOTIL) 2.5-0.025 mg per tablet Take 1 tablet by mouth 4 (four) times daily as needed for Diarrhea.      lisinopril (PRINIVIL,ZESTRIL) 20 MG tablet Take 20 mg by mouth once daily.         Past Surgical History:   Procedure Laterality Date    KNEE SURGERY         Social History     Socioeconomic History    Marital status:      Spouse name:  Not on file    Number of children: Not on file    Years of education: Not on file    Highest education level: Not on file   Occupational History    Not on file   Social Needs    Financial resource strain: Not on file    Food insecurity:     Worry: Not on file     Inability: Not on file    Transportation needs:     Medical: Not on file     Non-medical: Not on file   Tobacco Use    Smoking status: Current Every Day Smoker   Substance and Sexual Activity    Alcohol use: Not Currently     Frequency: Never    Drug use: Not Currently    Sexual activity: Not Currently   Lifestyle    Physical activity:     Days per week: Not on file     Minutes per session: Not on file    Stress: Not on file   Relationships    Social connections:     Talks on phone: Not on file     Gets together: Not on file     Attends Orthodox service: Not on file     Active member of club or organization: Not on file     Attends meetings of clubs or organizations: Not on file     Relationship status: Not on file   Other Topics Concern    Not on file   Social History Narrative    Not on file         Vital Signs Range (Last 24H):  Temp:  [36.9 °C (98.5 °F)-37.9 °C (100.2 °F)]   Pulse:  [63-89]   Resp:  [10-23]   BP: ()/()   SpO2:  [97 %-100 %]       CBC:   Recent Labs     19  1300   WBC 9.00   RBC 4.00*   HGB 12.9*   HCT 37.0*   *   MCV 93   MCH 32.3*   MCHC 34.9       CMP:   Recent Labs     19  1300   *   K 3.1*   CL 90*   CO2 25   BUN 13   CREATININE 0.9   *   CALCIUM 8.3*   ALBUMIN 2.5*   PROT 7.4   ALKPHOS 50*   ALT 35   AST 34   BILITOT 1.1*       INR  Recent Labs     19  1300   INR 1.9*   APTT 42.9*           Diagnostic Studies:      EKD Echo:        Anesthesia Evaluation    I have reviewed the Patient Summary Reports.    I have reviewed the Nursing Notes.   I have reviewed the Medications.     Review of Systems  Anesthesia Hx:  No problems with previous Anesthesia  History  of prior surgery of interest to airway management or planning: Denies Family Hx of Anesthesia complications.   Denies Personal Hx of Anesthesia complications.   Cardiovascular:   Hypertension Denies MI.  Denies CAD.    ECG has been reviewed.    Pulmonary:  Pulmonary Normal  Denies COPD.  Denies Sleep Apnea.    Renal/:  Renal/ Normal     Hepatic/GI:  Hepatic/GI Normal    Musculoskeletal:  Musculoskeletal Normal    Neurological:  Neurology Normal Denies Seizures.    Endocrine:   Denies Diabetes.        Physical Exam  General:  Malnutrition    Airway/Jaw/Neck:  Airway Findings: Mouth Opening: Normal Tongue: Normal  General Airway Assessment: Adult  Mallampati: I  Improves to I with phonation.  TM Distance: Normal, at least 6 cm  Jaw/Neck Findings:  Micrognathia: Negative Neck ROM: Normal ROM      Dental:  Dental Findings: In tact   Chest/Lungs:  Chest/Lungs Findings: Clear to auscultation, Normal Respiratory Rate     Heart/Vascular:  Heart Findings: Rate: Normal  Rhythm: Regular Rhythm  Sounds: Normal  Heart murmur: negative    Abdomen:  Abdomen Findings:  Normal, Nontender, Soft       Mental Status:  Mental Status Findings:  Cooperative, Alert and Oriented         Anesthesia Plan  Type of Anesthesia, risks & benefits discussed:  Anesthesia Type:  MAC, general  Patient's Preference:   Intra-op Monitoring Plan:   Intra-op Monitoring Plan Comments:   Post Op Pain Control Plan: multimodal analgesia, IV/PO Opioids PRN and per primary service following discharge from PACU  Post Op Pain Control Plan Comments:   Induction:   IV  Beta Blocker:  Patient is not currently on a Beta-Blocker (No further documentation required).       Informed Consent: Patient understands risks and agrees with Anesthesia plan.  Questions answered. Anesthesia consent signed with patient.  ASA Score: 3     Day of Surgery Review of History & Physical:    H&P update referred to the surgeon.         Ready For Surgery From Anesthesia Perspective.

## 2019-12-25 NOTE — TRANSFER OF CARE
Anesthesia Transfer of Care Note    Patient: Graham Penaloza    Procedure(s) Performed: Procedure(s) (LRB):  INCISION, ABSCESS, PERIRECTAL (N/A)    Patient location: PACU    Anesthesia Type: general    Transport from OR: Transported from OR on 6-10 L/min O2 by face mask with adequate spontaneous ventilation    Post pain: adequate analgesia    Post assessment: no apparent anesthetic complications    Post vital signs: stable    Level of consciousness: awake, alert and oriented    Nausea/Vomiting: no nausea/vomiting    Complications: none    Transfer of care protocol was followed      Last vitals: There were no vitals taken for this visit.

## 2019-12-25 NOTE — H&P
[facility]  Colorectal Surgery  History & Physical    Patient Name: Graham Penaloza  MRN: [id number]  Admission Date: 12/24/2019  Attending Physician: Nomi Sanchez MD   Primary Care Provider: Bennie Murphy MD    Subjective:     Chief Complaint/Reason for Admission: Laura    History of Present Illness:  59 year old male presents with diarrhea, abdominal pain, and perirectal pain. PMH HTN, initially presented to ED with his wife for emergent evaluation of persistent diarrhea over the last 14 days. In hindsight, he now wonders if he was not having diarrhea and that he's just been draining pus for about the past week from his anal area. Endorses worsening perianal pain over the past week. No prior episodes similar to this. Pain is on the right side, worsens with moving and sitting. Has not taken any PO today.  Admits to subjective fever and chills. Denies nausea, vomiting.  Was seen at ED in [facility] and found to have large perianal abscess, possible john's gangrene on CT scan, transferred here for definitive care.        PTA Medications   Medication Sig    amLODIPine (NORVASC) 10 MG tablet Take 10 mg by mouth once daily.    ciprofloxacin HCl (CIPRO) 500 MG tablet Take 500 mg by mouth every 12 (twelve) hours.    diphenoxylate-atropine 2.5-0.025 mg (LOMOTIL) 2.5-0.025 mg per tablet Take 1 tablet by mouth 4 (four) times daily as needed for Diarrhea.    lisinopril (PRINIVIL,ZESTRIL) 20 MG tablet Take 20 mg by mouth once daily.       Review of patient's allergies indicates:  No Known Allergies    Past Medical History:   Diagnosis Date    Hypertension      Past Surgical History:   Procedure Laterality Date    KNEE SURGERY       Family History     None        Tobacco Use    Smoking status: Current Every Day Smoker   Substance and Sexual Activity    Alcohol use: Not Currently     Frequency: Never    Drug use: Not Currently    Sexual activity: Not Currently     Review of Systems    Constitutional: Positive for appetite change and fever.   HENT: Negative.    Eyes: Negative.    Respiratory: Negative.    Cardiovascular: Negative.    Gastrointestinal: Positive for rectal pain.   Endocrine: Negative.    Genitourinary: Negative.    Musculoskeletal: Negative.    Allergic/Immunologic: Negative.    Neurological: Negative.    Hematological: Negative.    Psychiatric/Behavioral: Negative.      Objective:     Vital Signs (Most Recent):    Vital Signs (24h Range):  Temp:  [98.5 °F (36.9 °C)-100.2 °F (37.9 °C)] 98.5 °F (36.9 °C)  Pulse:  [63-89] 64  Resp:  [10-23] 18  SpO2:  [97 %-100 %] 99 %  BP: ()/() 121/70        There is no height or weight on file to calculate BMI.    Physical Exam   Constitutional: He is oriented to person, place, and time.   Mildly cachectic   HENT:   Head: Normocephalic.   Eyes: Pupils are equal, round, and reactive to light.   Neck: Normal range of motion.   Cardiovascular: Normal rate.   Pulmonary/Chest: Effort normal.   Abdominal: Soft. Bowel sounds are normal.   Genitourinary:   Genitourinary Comments: No perineal induration or tenderness   Musculoskeletal: Normal range of motion.   Neurological: He is alert and oriented to person, place, and time.   Skin: Skin is warm and dry.   Psychiatric: He has a normal mood and affect.       Anorectal Exam:  Anal Skin: large amount of induration and erythema to right perianal area. Spontaneous purulent drainage present on right, 3cm from anal skin    Digital Rectal Exam:  Deferred due to pain      Significant Labs:  BMP (Last 3 Results):   Recent Labs   Lab 12/24/19  1300   *   *   K 3.1*   CL 90*   CO2 25   BUN 13   CREATININE 0.9   CALCIUM 8.3*     CBC (Last 3 Results):   Recent Labs   Lab 12/24/19  1300   WBC 9.00   RBC 4.00*   HGB 12.9*   HCT 37.0*   *   MCV 93   MCH 32.3*   MCHC 34.9       Significant Diagnostics:  I have reviewed all pertinent imaging results/findings within the past 24  hours.    Assessment/Plan:     Perirectal abscess  59 year old male presents with large perianal abscess tracking from right side anteriorly. No signs of john gangrene on exam.   - IV zosyn now  - NPO  - OR for Incision and drainage of perirectal abscess. Booked as class B.   Risks, benefits, and alternatives to surgery discussed with Mr Penaloza, who wishes to proceed.           Timothy Alegria MD  Colorectal Surgery  [facility]

## 2019-12-25 NOTE — ANESTHESIA PROCEDURE NOTES
Peripheral IV Insertion    Diagnosis: I99.8 Other disorder of circulatory system    Patient location during procedure: OR  Procedure start time: 12/24/2019 10:01 PM  Timeout: 12/24/2019 10:01 PM  Procedure end time: 12/24/2019 10:01 PM    Staffing  Authorizing Provider: Aye Alvarez MD  Performing Provider: Rebel Cardenas CRNA    Anesthesiologist was present at the time of the procedure.  Peripheral IV Insertion  Skin Prep: chlorhexidine gluconate  Orientation: right  Location: forearm  Catheter Size: 18 G  Catheter placement by Anatomical landmarks. Heme positive aspiration all ports.Insertion Attempts: 1  Assessment  Dressing: secured with tape and tegaderm  Patient: Tolerated well  Line flushed easily.

## 2019-12-25 NOTE — SUBJECTIVE & OBJECTIVE
PTA Medications   Medication Sig    amLODIPine (NORVASC) 10 MG tablet Take 10 mg by mouth once daily.    ciprofloxacin HCl (CIPRO) 500 MG tablet Take 500 mg by mouth every 12 (twelve) hours.    diphenoxylate-atropine 2.5-0.025 mg (LOMOTIL) 2.5-0.025 mg per tablet Take 1 tablet by mouth 4 (four) times daily as needed for Diarrhea.    lisinopril (PRINIVIL,ZESTRIL) 20 MG tablet Take 20 mg by mouth once daily.       Review of patient's allergies indicates:  No Known Allergies    Past Medical History:   Diagnosis Date    Hypertension      Past Surgical History:   Procedure Laterality Date    KNEE SURGERY       Family History     None        Tobacco Use    Smoking status: Current Every Day Smoker   Substance and Sexual Activity    Alcohol use: Not Currently     Frequency: Never    Drug use: Not Currently    Sexual activity: Not Currently     Review of Systems   Constitutional: Positive for appetite change and fever.   HENT: Negative.    Eyes: Negative.    Respiratory: Negative.    Cardiovascular: Negative.    Gastrointestinal: Positive for rectal pain.   Endocrine: Negative.    Genitourinary: Negative.    Musculoskeletal: Negative.    Allergic/Immunologic: Negative.    Neurological: Negative.    Hematological: Negative.    Psychiatric/Behavioral: Negative.      Objective:     Vital Signs (Most Recent):    Vital Signs (24h Range):  Temp:  [98.5 °F (36.9 °C)-100.2 °F (37.9 °C)] 98.5 °F (36.9 °C)  Pulse:  [63-89] 64  Resp:  [10-23] 18  SpO2:  [97 %-100 %] 99 %  BP: ()/() 121/70        There is no height or weight on file to calculate BMI.    Physical Exam   Constitutional: He is oriented to person, place, and time.   Mildly cachectic   HENT:   Head: Normocephalic.   Eyes: Pupils are equal, round, and reactive to light.   Neck: Normal range of motion.   Cardiovascular: Normal rate.   Pulmonary/Chest: Effort normal.   Abdominal: Soft. Bowel sounds are normal.   Genitourinary:   Genitourinary Comments: No  perineal induration or tenderness   Musculoskeletal: Normal range of motion.   Neurological: He is alert and oriented to person, place, and time.   Skin: Skin is warm and dry.   Psychiatric: He has a normal mood and affect.       Anorectal Exam:  Anal Skin: large amount of induration and erythema to right perianal area. Spontaneous purulent drainage present on right, 3cm from anal skin    Digital Rectal Exam:  Deferred due to pain      Significant Labs:  BMP (Last 3 Results):   Recent Labs   Lab 12/24/19  1300   *   *   K 3.1*   CL 90*   CO2 25   BUN 13   CREATININE 0.9   CALCIUM 8.3*     CBC (Last 3 Results):   Recent Labs   Lab 12/24/19  1300   WBC 9.00   RBC 4.00*   HGB 12.9*   HCT 37.0*   *   MCV 93   MCH 32.3*   MCHC 34.9       Significant Diagnostics:  I have reviewed all pertinent imaging results/findings within the past 24 hours.

## 2019-12-25 NOTE — ED NOTES
Updated ALBERTINA Montes at [facility] that the patient has not left [facility] yet, awaiting ambulance service.

## 2019-12-25 NOTE — ANESTHESIA PROCEDURE NOTES
Intubation  Performed by: Rebel Cardenas CRNA  Authorized by: Aye Alvarez MD     Intubation:     Induction:  Intravenous    Intubated:  Postinduction    Mask Ventilation:  Easy mask    Attempts:  1    Attempted By:  CRNA    Blade:  Wilkes 2    Laryngeal View Grade: Grade I - full view of chords      Difficult Airway Encountered?: No      Complications:  None    Airway Device:  Oral endotracheal tube    Airway Device Size:  7.5    Style/Cuff Inflation:  Cuffed    Inflation Amount (mL):  10    Tube secured:  23    Placement Verified By:  Capnometry and Colorimetric ETCO2 device    Complicating Factors:  None    Findings Post-Intubation:  BS equal bilateral

## 2019-12-25 NOTE — ASSESSMENT & PLAN NOTE
59 year old male presents with large perianal abscess tracking from right side anteriorly. No signs of john gangrene on exam, s/p I&D with drain placement    - Augmentin  - Reg diet  - PRN dressing care  -Likely DC tomorrow

## 2019-12-25 NOTE — PLAN OF CARE
Plan of care reviewed with patient; verbalized understanding.   Medications reviewed and administered as ordered.  Rounding for safety and patient care per policy.   Safety precautions maintained.   Call light within reach, bed wheels locked, bed in lowest position, side rails ^x2, safety maintained. NADN, Will continue monitor.        Problem: Adult Inpatient Plan of Care  Goal: Plan of Care Review  Flowsheets (Taken 12/25/2019 0628)  Plan of Care Reviewed With: patient     Problem: Adult Inpatient Plan of Care  Goal: Absence of Hospital-Acquired Illness or Injury  Intervention: Identify and Manage Fall Risk  Flowsheets (Taken 12/25/2019 0628)  Safety Promotion/Fall Prevention: assistive device/personal item within reach; lighting adjusted; medications reviewed; nonskid shoes/socks when out of bed; side rails raised x 2; supervised activity; toileting scheduled; instructed to call staff for mobility

## 2019-12-25 NOTE — OP NOTE
"DATE OF PROCEDURE:  12/24/2019     PREOPERATIVE DIAGNOSES:  Perirectal abscess     POSTOPERATIVE DIAGNOSES:  Perirectal abscess    PROCEDURES PERFORMED:  Incision and drainage of perirectal abscess     SURGEON:  Nomi Sanchez M.D.     ASSISTANT: Timothy De La Torre M.D. [RES]     ANESTHESIA:  General endotracheal     IV FLUIDS:  1000 mL of crystalloid.     ESTIMATED BLOOD LOSS:  20 mL.     DRAINS:  18 Fr Pezzar drain, 1" Penrose drain     SPECIMENS:  Perirectal abscess contents to microbiology for culture and sensitivity    COMPLICATIONS:  None.     OPERATIVE FINDINGS:  Large ischiorectal fossa abscess on the right side tracking through a posterior midline connection in the superficial perianal space to the left ischiorectal fossa.     INDICATIONS:  The patient is a 59-year-old male who presented to an outside hospital with a several day history of perianal and perirectal pain.  CT scan showed a complex perirectal abscess, and he was transferred here for surgical management.  He is now being brought to the Operating Room for urgent examination under anesthesia and drainage of the abscess.     DESCRIPTION OF PROCEDURE:  The patient was identified, brought to the Operating Room and placed on the stretcher in a supine position after obtaining informed consent.  Venous sequential stockings were placed.   He had received intravenous antibiotics earlier in the day.  After induction of general endotracheal anesthesia, he was repositioned on the operating table in a prone   position using chest rolls and adequate padding of all pressure points.  The table was jackknifed and the buttocks were taped apart to efface the anal verge.  The perianal region was prepped and draped in the usual sterile fashion.      The right ischiorectal fossa was markedly erythematous and indurated, and prior to starting the case the skin overlying the abscess had actually opened up and had began to drain a large amount of foul-smelling purulent " "fluid.  Cultures were taken and sent to microbiology.  A Stephanie clamp was placed through the skin opening and spread to enter into the abscess cavity which tracked proximally for significant distance, up to the level of the levators.  We bluntly probed this abscess cavity to break up all the loculations within it.  We then tracked the Stephanie clamp from within the abscess cavity across the midline were there was a connection through the superficial perianal space to an extension of the abscess cavity to the left ischiorectal fossa.  A counter incision was made over this area to drain the left-sided extension of the abscess.  We tunneled a 1" Penrose catheter between the 2 skin openings and tied the ends of it together with #0 silk ties for it to be left in place as a drain postoperatively.  We probed both cavities extensively to be sure that there were no undrained loculations.  We performed anoscopy which revealed no significant intra anal findings.  There was no evidence of a posterior midline fistula and there was no evidence of deep post anal space involvement.  Both abscess cavities were irrigated copiously with sterile saline.  We placed an 18 Fr Pezzar catheter into the large abscess cavity on the right side and secured it at the skin level with a 2 0 nylon suture.  There was a fair amount of oozing coming from this cavity, so it was packed with 1" gauze packing strips for hemostasis.  An anal block was performed using Exparel and sterile dressings were applied.     The patient tolerated the procedure well with no complications.   He was extubated in the Operating Room and taken to Recovery in satisfactory condition.    All needle, instrument and sponge counts were correct at the end of the case.   I was present throughout the entire procedure.    Nomi Sanchez MD, FACS, FASCRS  Senior Staff Surgeon  Department of Colon & Rectal Surgery     This note was created using voice recognition software, and may contain " some unrecognized transcriptional errors.

## 2019-12-25 NOTE — ASSESSMENT & PLAN NOTE
59 year old male presents with large perianal abscess tracking from right side anteriorly. No signs of john gangrene on exam.   - IV zosyn now  - NPO  - OR for Incision and drainage of perirectal abscess. Booked as class B.   Risks, benefits, and alternatives to surgery discussed with Mr Penaloza, who wishes to proceed.

## 2019-12-25 NOTE — PROGRESS NOTES
[facility]  Colorectal Surgery  Progress Note    Patient Name: Graham Penaloza  MRN: [id number]  Admission Date: 12/24/2019  Hospital Length of Stay: 1 days  Attending Physician: Nomi Sanchez MD    Subjective:     Interval History: Drained last night.  Pain improved this morning.  Looks like dry scalp, no lice     Post-Op Info:  Procedure(s) (LRB):  INCISION, ABSCESS, PERIRECTAL (N/A)   1 Day Post-Op      Medications:  Continuous Infusions:  Scheduled Meds:   acetaminophen  650 mg Oral Q6H    amLODIPine  10 mg Oral Daily    amoxicillin-clavulanate 875-125mg  1 tablet Oral Q12H    heparin (porcine)  5,000 Units Subcutaneous Q8H    lisinopril  20 mg Oral Daily     PRN Meds:   HYDROmorphone    naloxone    ondansetron    ondansetron    ondansetron    oxyCODONE    oxyCODONE    sodium chloride 0.9%        Objective:     Vital Signs (Most Recent):  Temp: 97.7 °F (36.5 °C) (12/25/19 0551)  Pulse: (!) 55 (12/25/19 0551)  Resp: 18 (12/25/19 0551)  BP: 127/65 (12/25/19 0551)  SpO2: 98 % (12/25/19 0551) Vital Signs (24h Range):  Temp:  [97.7 °F (36.5 °C)-100.2 °F (37.9 °C)] 97.7 °F (36.5 °C)  Pulse:  [55-89] 55  Resp:  [10-23] 18  SpO2:  [97 %-100 %] 98 %  BP: ()/() 127/65     Intake/Output - Last 3 Shifts       12/23 0700 - 12/24 0659 12/24 0700 - 12/25 0659 12/25 0700 - 12/26 0659    I.V. (mL/kg)  1500 (28.1)     Total Intake(mL/kg)  1500 (28.1)     Net  +1500                  Physical Exam  Much less induration.  S/s drainage around drains. Less tender around anus    Significant Labs:  CBC (Last 3 Results):   Recent Labs   Lab 12/24/19  1300 12/25/19  0514   WBC 9.00 10.49   RBC 4.00* 3.76*   HGB 12.9* 11.9*   HCT 37.0* 35.7*   * 374*   MCV 93 95   MCH 32.3* 31.6*   MCHC 34.9 33.3     CMP (Last 3 Results):   Recent Labs   Lab 12/24/19  1300 12/25/19  0514   * 141*   CALCIUM 8.3* 7.9*   ALBUMIN 2.5*  --    PROT 7.4  --    * 133*   K 3.1* 4.4   CO2 25 24   CL  90* 101   BUN 13 11   CREATININE 0.9 0.7   ALKPHOS 50*  --    ALT 35  --    AST 34  --    BILITOT 1.1*  --      CRP (Last 3 Results): No results for input(s): CRP in the last 168 hours.        Assessment/Plan:     * Perirectal abscess  59 year old male presents with large perianal abscess tracking from right side anteriorly. No signs of john gangrene on exam, s/p I&D with drain placement    - Augmentin  - Reg diet  - PRN dressing care  -Likely DC tomorrow          Luis Shields MD  Colorectal Surgery  [facility]

## 2019-12-25 NOTE — BRIEF OP NOTE
[facility]  Brief Operative Note    SUMMARY     Surgery Date: 12/24/2019     Surgeon(s) and Role:     * Nomi Sanchez MD - Primary     * Timothy Alegria MD - Resident - Assisting        Pre-op Diagnosis:  Perirectal abscess [K61.1]    Post-op Diagnosis:  Post-Op Diagnosis Codes:     * Perirectal abscess [K61.1]    Procedure(s) (LRB):  INCISION, ABSCESS, PERIRECTAL (N/A)    Anesthesia: General    Description of Procedure: incision and drainage of large perirectal abscess with penrose and pezzar drains placed    Description of the findings of the procedure: Large right sided ischiorectal abscess with posterior component extending to left side posteriorly. No deep postanal space component noted.    Estimated Blood Loss: * No values recorded between 12/24/2019 10:23 PM and 12/24/2019 10:49 PM *         Specimens:   Specimen (12h ago, onward)    None

## 2019-12-25 NOTE — ED NOTES
"Called [facility] for ETA on transport. Spoke with Kyara. States "We have them on the list to go out. We have a lot of emergent calls going on right now in [facility]. We will be there as soon as we can."   Dr. Olivia notified.   "

## 2019-12-26 ENCOUNTER — TELEPHONE (OUTPATIENT)
Dept: SURGERY | Facility: CLINIC | Age: 59
End: 2019-12-26

## 2019-12-26 VITALS
HEART RATE: 57 BPM | SYSTOLIC BLOOD PRESSURE: 130 MMHG | TEMPERATURE: 96 F | WEIGHT: 117.75 LBS | DIASTOLIC BLOOD PRESSURE: 77 MMHG | OXYGEN SATURATION: 97 % | BODY MASS INDEX: 17.85 KG/M2 | HEIGHT: 68 IN | RESPIRATION RATE: 18 BRPM

## 2019-12-26 LAB
GRAM STN SPEC: NORMAL

## 2019-12-26 PROCEDURE — 87070 CULTURE OTHR SPECIMN AEROBIC: CPT

## 2019-12-26 PROCEDURE — 87015 SPECIMEN INFECT AGNT CONCNTJ: CPT

## 2019-12-26 PROCEDURE — 87075 CULTR BACTERIA EXCEPT BLOOD: CPT

## 2019-12-26 PROCEDURE — 87116 MYCOBACTERIA CULTURE: CPT

## 2019-12-26 PROCEDURE — 25000003 PHARM REV CODE 250: Performed by: STUDENT IN AN ORGANIZED HEALTH CARE EDUCATION/TRAINING PROGRAM

## 2019-12-26 PROCEDURE — 63600175 PHARM REV CODE 636 W HCPCS: Performed by: SURGERY

## 2019-12-26 PROCEDURE — 87102 FUNGUS ISOLATION CULTURE: CPT

## 2019-12-26 PROCEDURE — 25000003 PHARM REV CODE 250: Performed by: SURGERY

## 2019-12-26 PROCEDURE — 87205 SMEAR GRAM STAIN: CPT

## 2019-12-26 PROCEDURE — 87076 CULTURE ANAEROBE IDENT EACH: CPT | Mod: 59

## 2019-12-26 PROCEDURE — 87206 SMEAR FLUORESCENT/ACID STAI: CPT

## 2019-12-26 RX ORDER — METRONIDAZOLE 500 MG/1
500 TABLET ORAL EVERY 8 HOURS
Status: DISCONTINUED | OUTPATIENT
Start: 2019-12-26 | End: 2019-12-26 | Stop reason: HOSPADM

## 2019-12-26 RX ORDER — OXYCODONE HYDROCHLORIDE 5 MG/1
5-10 TABLET ORAL EVERY 4 HOURS PRN
Qty: 40 TABLET | Refills: 0 | Status: SHIPPED | OUTPATIENT
Start: 2019-12-26 | End: 2019-12-26

## 2019-12-26 RX ORDER — METRONIDAZOLE 500 MG/1
500 TABLET ORAL EVERY 8 HOURS
Qty: 21 TABLET | Refills: 0 | Status: SHIPPED | OUTPATIENT
Start: 2019-12-26 | End: 2020-01-02

## 2019-12-26 RX ORDER — CIPROFLOXACIN 500 MG/1
500 TABLET ORAL EVERY 12 HOURS
Qty: 14 TABLET | Refills: 0 | Status: SHIPPED | OUTPATIENT
Start: 2019-12-26 | End: 2020-01-02

## 2019-12-26 RX ORDER — IBUPROFEN 800 MG/1
800 TABLET ORAL EVERY 8 HOURS
Qty: 15 TABLET | Refills: 0 | Status: SHIPPED | OUTPATIENT
Start: 2019-12-26 | End: 2019-12-31

## 2019-12-26 RX ORDER — CIPROFLOXACIN 500 MG/1
500 TABLET ORAL EVERY 12 HOURS
Status: DISCONTINUED | OUTPATIENT
Start: 2019-12-26 | End: 2019-12-26 | Stop reason: HOSPADM

## 2019-12-26 RX ORDER — OXYCODONE HYDROCHLORIDE 5 MG/1
5-10 TABLET ORAL EVERY 4 HOURS PRN
Qty: 40 TABLET | Refills: 0 | Status: SHIPPED | OUTPATIENT
Start: 2019-12-26

## 2019-12-26 RX ORDER — ACETAMINOPHEN 500 MG
500 TABLET ORAL EVERY 8 HOURS
Qty: 100 TABLET | Refills: 0 | Status: SHIPPED | OUTPATIENT
Start: 2019-12-26

## 2019-12-26 RX ADMIN — HEPARIN SODIUM 5000 UNITS: 5000 INJECTION, SOLUTION INTRAVENOUS; SUBCUTANEOUS at 05:12

## 2019-12-26 RX ADMIN — ACETAMINOPHEN 650 MG: 325 TABLET ORAL at 12:12

## 2019-12-26 RX ADMIN — AMOXICILLIN AND CLAVULANATE POTASSIUM 1 TABLET: 875; 125 TABLET, FILM COATED ORAL at 09:12

## 2019-12-26 RX ADMIN — CIPROFLOXACIN HYDROCHLORIDE 500 MG: 500 TABLET, FILM COATED ORAL at 12:12

## 2019-12-26 RX ADMIN — ACETAMINOPHEN 650 MG: 325 TABLET ORAL at 05:12

## 2019-12-26 NOTE — DISCHARGE SUMMARY
[facility]  Colorectal Surgery  Discharge Summary      Patient Name: Graham Penaloza  MRN: [id number]  Admission Date: 12/24/2019  Hospital Length of Stay: 2 days  Discharge Date and Time: 12/26/2019  1:09 PM  Attending Physician: Nomi Sanchez MD   Discharging Provider: Marty Knutson MD  Primary Care Provider: Bennie Murphy MD     HPI: 59 year old male who presented with perirectal pain, abdominal pain.  He was found to have a large anorectal abscess with tracking on the right side anteriorly.    Procedure(s) (LRB):  INCISION, ABSCESS, PERIRECTAL (N/A)     Hospital Course: The patient was given IV antibiotics (Zosyn) and taken to the OR where incision and drainage was performed including placement of a Penrose and a Pezzar drain.  Packing was also placed within a large cavity.  He was admitted and kept on antibiotics.  He did well and was discharged home post-operative day #2 with a 7 day course of ciprofloxacin and Flagyl with follow-up with Dr. Sanchez.  The packing was removed prior to discharge.    Pending Diagnostic Studies:     None        Final Active Diagnoses:    Diagnosis Date Noted POA    PRINCIPAL PROBLEM:  Perirectal abscess [K61.1] 12/24/2019 Yes      Problems Resolved During this Admission:      Discharged Condition: good    Disposition: Home or Self Care    Follow Up:  Follow-up Information     Nomi Sanchez MD On 1/6/2020.    Specialty:  Colon and Rectal Surgery  Contact information:  [address] [place] [id number]  [phone]                 Patient Instructions:      Diet Adult Regular     Notify your health care provider if you experience any of the following:  temperature >100.4     Notify your health care provider if you experience any of the following:  persistent nausea and vomiting or diarrhea     Notify your health care provider if you experience any of the following:  severe uncontrolled pain     Change dressing (specify)   Order Comments: Dressing  change: Daily and as needed for drainage.  Sit in a warm bath tub twice daily.     Activity as tolerated     Medications:  Reconciled Home Medications:      Medication List      START taking these medications    acetaminophen 500 MG tablet  Commonly known as:  TYLENOL  Take 1 tablet (500 mg total) by mouth every 8 (eight) hours.     ibuprofen 800 MG tablet  Commonly known as:  ADVIL,MOTRIN  Take 1 tablet (800 mg total) by mouth every 8 (eight) hours. for 5 days     metroNIDAZOLE 500 MG tablet  Commonly known as:  FLAGYL  Take 1 tablet (500 mg total) by mouth every 8 (eight) hours. for 7 days     oxyCODONE 5 MG immediate release tablet  Commonly known as:  ROXICODONE  Take 1-2 tablets (5-10 mg total) by mouth every 4 (four) hours as needed for Pain.        CHANGE how you take these medications    ciprofloxacin HCl 500 MG tablet  Commonly known as:  CIPRO  Take 1 tablet (500 mg total) by mouth every 12 (twelve) hours. for 7 days  What changed:  when to take this        CONTINUE taking these medications    amLODIPine 10 MG tablet  Commonly known as:  NORVASC  Take 10 mg by mouth once daily.     diphenoxylate-atropine 2.5-0.025 mg 2.5-0.025 mg per tablet  Commonly known as:  LOMOTIL  Take 1 tablet by mouth 4 (four) times daily as needed for Diarrhea.     lisinopril 20 MG tablet  Commonly known as:  PRINIVIL,ZESTRIL  Take 20 mg by mouth once daily.            Marty Knutson MD  Colorectal Surgery  [facility]

## 2019-12-26 NOTE — TELEPHONE ENCOUNTER
----- Message from Fely Kang MA sent at 12/26/2019 12:13 PM CST -----  Contact: [phone]  Please contact Pt to Schedule appt for 1/6. Requesting call back . New Patient.

## 2019-12-26 NOTE — TELEPHONE ENCOUNTER
----- Message from Fely Kang MA sent at 12/26/2019 12:13 PM CST -----  Contact: [phone]  Please contact Pt to Schedule appt for 1/6. Requesting call back . New Patient.

## 2019-12-26 NOTE — TELEPHONE ENCOUNTER
Spoke with patient's wife, Veronica. Wife states that fellow that came by said that patient would need an appointment for Jan 6th. Dr Sanchez is full that day, appointment made for patient on for Jan 13th, but will check with Dr Sanchez if he would like for me to overbook him on the 6th.

## 2019-12-26 NOTE — PLAN OF CARE
12/26/19 1233   Post-Acute Status   Post-Acute Authorization Other   Other Status No Post-Acute Service Needs   This SW in communication with  and medical team. SW will continue to follow for discharge needs and offer support as needed.    No SW needs identified at this time.    Shara Ellington LMSW  [facility]  [id number]

## 2019-12-26 NOTE — NURSING
Pt discharged home. Aaox4. VSS. Rectal drainage tube in place -- no active drainage noted. Packing was removed by MD. D/C instructions given -- understanding verbalized.

## 2019-12-26 NOTE — ANESTHESIA POSTPROCEDURE EVALUATION
Anesthesia Post Evaluation    Patient: Graham Penaloza    Procedure(s) Performed: Procedure(s) (LRB):  INCISION, ABSCESS, PERIRECTAL (N/A)    Final Anesthesia Type: general    Patient location during evaluation: PACU  Patient participation: Yes- Able to Participate  Level of consciousness: awake and alert  Post-procedure vital signs: reviewed and stable  Pain management: adequate  Airway patency: patent    PONV status at discharge: No PONV  Anesthetic complications: no      Cardiovascular status: stable  Respiratory status: unassisted and spontaneous ventilation  Hydration status: euvolemic  Follow-up not needed.          Vitals Value Taken Time   /74 12/26/2019  4:50 AM   Temp 36.7 °C (98 °F) 12/26/2019  4:50 AM   Pulse 51 12/26/2019  4:50 AM   Resp 19 12/26/2019  4:50 AM   SpO2 99 % 12/26/2019  4:50 AM         Event Time     Out of Recovery 12/24/2019 23:45:00          Pain/Marvin Score: Pain Rating Prior to Med Admin: 0 (12/26/2019  5:10 AM)

## 2019-12-26 NOTE — PLAN OF CARE
Plan of care reviewed with pt. Pt aox4, VS as charted. Purposeful rounding for pt care and safety. No complaints of pain or reports of NV. No falls/injury reported this shift. Pinrose drain in place. Minimal blood drainage from site. Pt experienced 2 loose stools over night. Skin integrity intact. SCD in place. Safety precautions maintained - bed in low position, call light in reach, side rails up x2.

## 2019-12-26 NOTE — PLAN OF CARE
CM met with patient and patient's wife to complete discharge assessment and planning. Patient plans to discharge to home when medically stable for hospital discharge. CM and SW will continue to follow for discharge needs.       12/26/19 1145   Discharge Assessment   Assessment Type Discharge Planning Assessment   Confirmed/corrected address and phone number on facesheet? Yes   Assessment information obtained from? Patient   Communicated expected length of stay with patient/caregiver yes   Prior to hospitilization cognitive status: Alert/Oriented   Prior to hospitalization functional status: Independent   Current cognitive status: Alert/Oriented   Current Functional Status: Independent   Lives With spouse   Able to Return to Prior Arrangements yes   Is patient able to care for self after discharge? Yes   Who are your caregiver(s) and their phone number(s)? spouse,Veronica Penaloza [phone]   Patient's perception of discharge disposition home or selfcare   Readmission Within the Last 30 Days no previous admission in last 30 days   Patient currently being followed by outpatient case management? No   Patient currently receives any other outside agency services? No   Equipment Currently Used at Home none   Do you have any problems affording any of your prescribed medications? TBD   Is the patient taking medications as prescribed? yes   Does the patient have transportation home? Yes   Transportation Anticipated family or friend will provide   Dialysis Name and Scheduled days n/a   Does the patient receive services at the [facility]? No   Discharge Plan A Home;Home with family   Discharge Plan B Home;Home with family;Home Health   DME Needed Upon Discharge  none   Patient/Family in Agreement with Plan yes

## 2019-12-26 NOTE — PLAN OF CARE
CM met with patient and pt's wife, Veronica ( [phone]) to discuss today's discharge to home. Patient denies need for additional assistance at home. CM informed patient of follow up appointment with Dr Sanchez, pt verbalized understanding. Patient request to have all prescriptions sent to [facility] [place]. CM informed Dr Knutson of patient's request.      [facility] #[id number] - [place], [place] - [address]  [address] [place] [address]  Phone: [phone] Fax: [phone]       12/26/19 1207   Final Note   Assessment Type Final Discharge Note   What phone number can be called within the next 1-3 days to see how you are doing after discharge? [phone]   Hospital Follow Up  Appt(s) scheduled? Yes   Discharge plans and expectations educations in teach back method with documentation complete? Yes

## 2019-12-29 LAB
BACTERIA BLD CULT: NORMAL
BACTERIA BLD CULT: NORMAL
BACTERIA SPEC AEROBE CULT: ABNORMAL

## 2019-12-30 ENCOUNTER — PATIENT OUTREACH (OUTPATIENT)
Dept: ADMINISTRATIVE | Facility: CLINIC | Age: 59
End: 2019-12-30

## 2019-12-30 LAB
BACTERIA SPEC ANAEROBE CULT: ABNORMAL
BACTERIA SPEC ANAEROBE CULT: ABNORMAL

## 2019-12-30 NOTE — PATIENT INSTRUCTIONS
Perianal Abscess, Incision and Drainage  Glands near the anus can become blocked. This can lead to infection. If the infection cannot drain, a collection of pus called an abscess may form. Symptoms of an abscess include pain, itching, swelling, and fever.  Treatment of this infection has required an incision to drain the pus from the abscess. A gauze packing may have been put into the abscess opening. This should be removed within 1-2 days. if it falls out sooner, do not try to put it back in. Treatment with antibiotics may or may not be needed.  Healing of the wound will take about 1 to 2 weeks, depending on the size of the abscess.  Home care  · The wound may drain for the first several days. Cover the wound with a clean dry bandage. Change the dressing if it becomes soaked with blood or pus, or soiled with feces.  · If gauze packing was placed inside the abscess cavity, you may be told to remove it yourself. Do this only do it if the doctor told you to. You may do this in the shower. Once the packing is removed, wash the area carefully once a day until the skin opening has closed.   · Try sitz baths. Sit in a tub filled with about 6 inches of hot water for 15-30 minutes. (Test the water temperature before sitting down to ensure it will not burn you.) Repeat this twice a day until pain is relieved.  · If you were prescribed antibiotics, take all of the medicine as prescribed. Continue it even if you start feeling better. All of the medicine should be finished unless your healthcare provider tells you to stop.  · Unless a pain medicine has been prescribed, you may take an over-the-counter medicine, such as ibuprofen, for pain.  · Passing stools may be painful. If so, ask your healthcare provider about using a stool-softener for a short time.  Follow-up care  Follow up with your healthcare provider as advised by our staff.  When to seek medical advice  Call your health care provider if any of the following  occur:  · Increasing pain, swelling, or redness  · Pus continuing to drain from the wound 5 days after the incision  · Fever of 100.4ºF (38ºC) or higher, or as directed by your healthcare provider  Date Last Reviewed: 6/22/2015  © 5516-3636 [employer]. [address] [place] [address]. All rights reserved. This information is not intended as a substitute for professional medical care. Always follow your healthcare professional's instructions.

## 2020-01-06 ENCOUNTER — OFFICE VISIT (OUTPATIENT)
Dept: SURGERY | Facility: CLINIC | Age: 60
End: 2020-01-06
Payer: COMMERCIAL

## 2020-01-06 VITALS
SYSTOLIC BLOOD PRESSURE: 113 MMHG | HEIGHT: 68 IN | HEART RATE: 90 BPM | DIASTOLIC BLOOD PRESSURE: 84 MMHG | BODY MASS INDEX: 19.28 KG/M2 | WEIGHT: 127.19 LBS

## 2020-01-06 DIAGNOSIS — K61.1 PERIRECTAL ABSCESS: Primary | ICD-10-CM

## 2020-01-06 PROCEDURE — 99024 PR POST-OP FOLLOW-UP VISIT: ICD-10-PCS | Mod: S$GLB,,, | Performed by: COLON & RECTAL SURGERY

## 2020-01-06 PROCEDURE — 99999 PR PBB SHADOW E&M-EST. PATIENT-LVL III: ICD-10-PCS | Mod: PBBFAC,,, | Performed by: COLON & RECTAL SURGERY

## 2020-01-06 PROCEDURE — 99999 PR PBB SHADOW E&M-EST. PATIENT-LVL III: CPT | Mod: PBBFAC,,, | Performed by: COLON & RECTAL SURGERY

## 2020-01-06 PROCEDURE — 99024 POSTOP FOLLOW-UP VISIT: CPT | Mod: S$GLB,,, | Performed by: COLON & RECTAL SURGERY

## 2020-01-06 RX ORDER — SODIUM, POTASSIUM,MAG SULFATES 17.5-3.13G
SOLUTION, RECONSTITUTED, ORAL ORAL
COMMUNITY

## 2020-01-06 RX ORDER — IBUPROFEN 800 MG/1
800 TABLET ORAL 3 TIMES DAILY
COMMUNITY

## 2020-01-06 NOTE — LETTER
January 27, 2020      Bennie Murphy MD  [address]  [facility] [place] [address]           [facility]  [address]  [facility] [address]  Phone: [phone]          Patient: Graham Penaloza   MR Number: [id number]   YOB: 1960   Date of Visit: 1/6/2020       Dear Dr. Bennie Murphy:    Thank you for referring Graham Penaloza to me for evaluation. Attached you will find relevant portions of my assessment and plan of care.    If you have questions, please do not hesitate to call me. I look forward to following Graham Penaloza along with you.    Sincerely,    Nomi Sanchez MD    Enclosure  CC:  No Recipients    If you would like to receive this communication electronically, please contact [email or web address] or [phone] to request more information on [employer] access.    For providers and/or their staff who would like to refer a patient to [facility], please contact us through our one-stop-shop provider referral line, [facility], at [phone].    If you feel you have received this communication in error or would no longer like to receive these types of communications, please e-mail [email or web address]

## 2020-01-27 NOTE — PROGRESS NOTES
CRS Post-operative visit    Visit Info:     Procedure:  Incision and drainage of perirectal abscess    Date of Procedure: December 24, 2019    Indication: 59-year-old male who presented to an outside hospital with a several day history of perianal and perirectal pain.  CT scan showed a complex perirectal abscess, and he was transferred here for surgical management.   At the time of surgery, he was found to have a large ischiorectal fossa abscess on the right side tracking through a posterior midline connection in the superficial perianal space to the left ischiorectal fossa.    Current Status:  Doing well postop, complains of some irritation from the drains, but no significant pain.  No fevers or chills.  No rectal bleeding.    Pathology:   N/A    Physical Exam:  General: Other male in NAD   Neuro: aaox4   Respiratory: resps even unlabored  Extremities: Warm dry and intact  Anorectal: Pezzar catheter in place in the right ischiorectal fossa, tenderness drain tunneled between the right-sided drainage site and a left-sided counter incision posteriorly, no significant surrounding erythema or induration.  No fluctuance.    Assessment:  Complex perirectal abscess, s/p I&D    Plan:  Both drains removed.  Patient counseled regarding local wound care.  RTO 4 weeks    Nomi Sanchez MD, FACS, FASCRS  Senior Staff Surgeon  Department of Colon & Rectal Surgery     This note was created using voice recognition software, and may contain some unrecognized transcriptional errors.

## 2020-01-28 LAB — FUNGUS SPEC CULT: NORMAL

## 2020-02-03 ENCOUNTER — OFFICE VISIT (OUTPATIENT)
Dept: SURGERY | Facility: CLINIC | Age: 60
End: 2020-02-03
Payer: COMMERCIAL

## 2020-02-03 VITALS
SYSTOLIC BLOOD PRESSURE: 140 MMHG | HEIGHT: 68 IN | DIASTOLIC BLOOD PRESSURE: 84 MMHG | WEIGHT: 137.13 LBS | HEART RATE: 78 BPM | BODY MASS INDEX: 20.78 KG/M2

## 2020-02-03 DIAGNOSIS — K61.1 PERIRECTAL ABSCESS: Primary | ICD-10-CM

## 2020-02-03 PROCEDURE — 99999 PR PBB SHADOW E&M-EST. PATIENT-LVL III: CPT | Mod: PBBFAC,,, | Performed by: COLON & RECTAL SURGERY

## 2020-02-03 PROCEDURE — 99024 POSTOP FOLLOW-UP VISIT: CPT | Mod: S$GLB,,, | Performed by: COLON & RECTAL SURGERY

## 2020-02-03 PROCEDURE — 99999 PR PBB SHADOW E&M-EST. PATIENT-LVL III: ICD-10-PCS | Mod: PBBFAC,,, | Performed by: COLON & RECTAL SURGERY

## 2020-02-03 PROCEDURE — 99024 PR POST-OP FOLLOW-UP VISIT: ICD-10-PCS | Mod: S$GLB,,, | Performed by: COLON & RECTAL SURGERY

## 2020-02-03 NOTE — PROGRESS NOTES
CRS Post-operative visit    Visit Info:     Procedure:  Incision and drainage of perirectal abscess    Date of Procedure: December 24, 2019    Indication: 59-year-old male who presented to an outside hospital with a several day history of perianal and perirectal pain.  CT scan showed a complex perirectal abscess, and he was transferred here for surgical management.   At the time of surgery, he was found to have a large ischiorectal fossa abscess on the right side tracking through a posterior midline connection in the superficial perianal space to the left ischiorectal fossa.    Current Status:  POV#1 1/27/20  Doing well postop, complains of some irritation from the drains, but no significant pain.  No fevers or chills.  No rectal bleeding. Drains removed with 4 week follow up scheduled    POV#2 2/3/20 No pain, discomfort, fevers, chills, drainage, or any other problems or complaints. Feels well today. Having regular bowel movements.     Pathology:   N/A    Physical Exam:  General: Other male in NAD   Neuro: aaox4   Respiratory: resps even unlabored  Extremities: Warm dry and intact  Anorectal: Well healed incisions, some mild induration. No erythema. No fluctuance.    Assessment:  Complex perirectal abscess, s/p I&D    Plan:    Follow up as needed    Marty Meredith MD PGY V  [phone]    I have interviewed and examined the patient, reviewed the notes and assessments, and/or personally supervised the procedure(s) performed by Dr. Meredith, and I concur with her/his documentation of Graham Penaloza.  See below addendum for my evaluation and additional findings.    Doing well - no discomfort or bleeding. No drainage.  No fevers or chills.  Exam as noted above  RTO prn    Nomi Sanchez MD, FACS, FASCRS  Senior Staff Surgeon  Department of Colon & Rectal Surgery

## 2020-02-27 LAB
ACID FAST MOD KINY STN SPEC: NORMAL
MYCOBACTERIUM SPEC QL CULT: NORMAL

## (undated) DEVICE — ELECTRODE REM PLYHSV RETURN 9

## (undated) DEVICE — TUBE PENROSE DRAIN 18INX5/8IN

## (undated) DEVICE — GAUZE SPONGE 4X4 12PLY

## (undated) DEVICE — PANTIES FEMININE NAPKIN LG/XLG

## (undated) DEVICE — TRAY MINOR GEN SURG

## (undated) DEVICE — SEE MEDLINE ITEM 157117

## (undated) DEVICE — SEE MEDLINE ITEM 154981

## (undated) DEVICE — LUBRICANT SURGILUBE 2 OZ

## (undated) DEVICE — GAUZE PACKING STRIP PLAIN 1X5

## (undated) DEVICE — Device

## (undated) DEVICE — TAPE SURG DURAPORE 2 X10YD

## (undated) DEVICE — SYR ONLY LUER LOCK 20CC

## (undated) DEVICE — SEE MEDLINE ITEM 157148

## (undated) DEVICE — SEE MEDLINE ITEM 152622

## (undated) DEVICE — SEE MEDLINE ITEM 146417

## (undated) DEVICE — BOVIE SUCTION

## (undated) DEVICE — NDL 22GA X1 1/2 REG BEVEL